# Patient Record
Sex: FEMALE | Race: WHITE | Employment: PART TIME
[De-identification: names, ages, dates, MRNs, and addresses within clinical notes are randomized per-mention and may not be internally consistent; named-entity substitution may affect disease eponyms.]

---

## 2017-02-03 ENCOUNTER — RECORDS - HEALTHEAST (OUTPATIENT)
Dept: ADMINISTRATIVE | Facility: OTHER | Age: 32
End: 2017-02-03

## 2017-07-30 ENCOUNTER — COMMUNICATION - HEALTHEAST (OUTPATIENT)
Dept: FAMILY MEDICINE | Facility: CLINIC | Age: 32
End: 2017-07-30

## 2017-07-30 DIAGNOSIS — Z30.9 CONTRACEPTION MANAGEMENT: ICD-10-CM

## 2017-08-22 ENCOUNTER — OFFICE VISIT - HEALTHEAST (OUTPATIENT)
Dept: FAMILY MEDICINE | Facility: CLINIC | Age: 32
End: 2017-08-22

## 2017-08-22 DIAGNOSIS — Z30.9 CONTRACEPTION MANAGEMENT: ICD-10-CM

## 2017-08-22 DIAGNOSIS — K21.9 GERD (GASTROESOPHAGEAL REFLUX DISEASE): ICD-10-CM

## 2017-08-29 ENCOUNTER — COMMUNICATION - HEALTHEAST (OUTPATIENT)
Dept: FAMILY MEDICINE | Facility: CLINIC | Age: 32
End: 2017-08-29

## 2017-08-29 DIAGNOSIS — Z30.9 CONTRACEPTION MANAGEMENT: ICD-10-CM

## 2017-08-30 ENCOUNTER — RECORDS - HEALTHEAST (OUTPATIENT)
Dept: ADMINISTRATIVE | Facility: OTHER | Age: 32
End: 2017-08-30

## 2017-09-01 ENCOUNTER — COMMUNICATION - HEALTHEAST (OUTPATIENT)
Dept: FAMILY MEDICINE | Facility: CLINIC | Age: 32
End: 2017-09-01

## 2017-09-01 DIAGNOSIS — Z13.9 SCREENING: ICD-10-CM

## 2017-09-06 ENCOUNTER — AMBULATORY - HEALTHEAST (OUTPATIENT)
Dept: NURSING | Facility: CLINIC | Age: 32
End: 2017-09-06

## 2017-09-06 DIAGNOSIS — Z13.9 SCREENING: ICD-10-CM

## 2017-09-08 ENCOUNTER — AMBULATORY - HEALTHEAST (OUTPATIENT)
Dept: NURSING | Facility: CLINIC | Age: 32
End: 2017-09-08

## 2017-12-06 ENCOUNTER — COMMUNICATION - HEALTHEAST (OUTPATIENT)
Dept: SCHEDULING | Facility: CLINIC | Age: 32
End: 2017-12-06

## 2017-12-08 ENCOUNTER — OFFICE VISIT - HEALTHEAST (OUTPATIENT)
Dept: FAMILY MEDICINE | Facility: CLINIC | Age: 32
End: 2017-12-08

## 2017-12-08 ENCOUNTER — COMMUNICATION - HEALTHEAST (OUTPATIENT)
Dept: TELEHEALTH | Facility: CLINIC | Age: 32
End: 2017-12-08

## 2017-12-08 DIAGNOSIS — M25.50 JOINT PAIN: ICD-10-CM

## 2017-12-11 ENCOUNTER — AMBULATORY - HEALTHEAST (OUTPATIENT)
Dept: FAMILY MEDICINE | Facility: CLINIC | Age: 32
End: 2017-12-11

## 2017-12-11 ENCOUNTER — COMMUNICATION - HEALTHEAST (OUTPATIENT)
Dept: FAMILY MEDICINE | Facility: CLINIC | Age: 32
End: 2017-12-11

## 2017-12-11 DIAGNOSIS — M25.50 JOINT PAIN: ICD-10-CM

## 2017-12-11 LAB — ANA SER QL: 0.4 U

## 2018-02-05 ENCOUNTER — AMBULATORY - HEALTHEAST (OUTPATIENT)
Dept: RHEUMATOLOGY | Facility: CLINIC | Age: 33
End: 2018-02-05

## 2018-03-13 ENCOUNTER — COMMUNICATION - HEALTHEAST (OUTPATIENT)
Dept: SCHEDULING | Facility: CLINIC | Age: 33
End: 2018-03-13

## 2018-03-19 ENCOUNTER — COMMUNICATION - HEALTHEAST (OUTPATIENT)
Dept: TELEHEALTH | Facility: CLINIC | Age: 33
End: 2018-03-19

## 2018-03-19 ENCOUNTER — OFFICE VISIT - HEALTHEAST (OUTPATIENT)
Dept: FAMILY MEDICINE | Facility: CLINIC | Age: 33
End: 2018-03-19

## 2018-03-19 DIAGNOSIS — Z71.84 COUNSELING ABOUT TRAVEL: ICD-10-CM

## 2018-05-03 ENCOUNTER — COMMUNICATION - HEALTHEAST (OUTPATIENT)
Dept: SCHEDULING | Facility: CLINIC | Age: 33
End: 2018-05-03

## 2018-07-24 ENCOUNTER — HEALTH MAINTENANCE LETTER (OUTPATIENT)
Age: 33
End: 2018-07-24

## 2018-08-24 ENCOUNTER — RECORDS - HEALTHEAST (OUTPATIENT)
Dept: ADMINISTRATIVE | Facility: OTHER | Age: 33
End: 2018-08-24

## 2018-11-21 ENCOUNTER — COMMUNICATION - HEALTHEAST (OUTPATIENT)
Dept: FAMILY MEDICINE | Facility: CLINIC | Age: 33
End: 2018-11-21

## 2018-11-21 DIAGNOSIS — Z30.9 CONTRACEPTION MANAGEMENT: ICD-10-CM

## 2019-02-17 ENCOUNTER — COMMUNICATION - HEALTHEAST (OUTPATIENT)
Dept: FAMILY MEDICINE | Facility: CLINIC | Age: 34
End: 2019-02-17

## 2019-02-17 DIAGNOSIS — Z30.9 CONTRACEPTION MANAGEMENT: ICD-10-CM

## 2019-02-20 ENCOUNTER — COMMUNICATION - HEALTHEAST (OUTPATIENT)
Dept: FAMILY MEDICINE | Facility: CLINIC | Age: 34
End: 2019-02-20

## 2019-02-20 DIAGNOSIS — Z30.9 CONTRACEPTION MANAGEMENT: ICD-10-CM

## 2020-03-02 ENCOUNTER — HEALTH MAINTENANCE LETTER (OUTPATIENT)
Age: 35
End: 2020-03-02

## 2021-05-30 ENCOUNTER — RECORDS - HEALTHEAST (OUTPATIENT)
Dept: ADMINISTRATIVE | Facility: CLINIC | Age: 36
End: 2021-05-30

## 2021-05-31 VITALS — WEIGHT: 137.2 LBS | BODY MASS INDEX: 23.55 KG/M2

## 2021-05-31 VITALS — BODY MASS INDEX: 23.33 KG/M2 | WEIGHT: 135.9 LBS

## 2021-06-01 VITALS — WEIGHT: 135.7 LBS | BODY MASS INDEX: 23.29 KG/M2

## 2021-06-12 NOTE — PROGRESS NOTES
Assessment/Plan:        Diagnoses and all orders for this visit:    GERD (gastroesophageal reflux disease)    Contraception management  -     norethindrone-ethinyl estradiol (ROSITA FE 1/20, 28,) 1 mg-20 mcg (21)/75 mg (7) per tablet; TAKE ONE TABLET BY MOUTH EVERY DAY  Dispense: 84 tablet; Refill: 3    Other orders  -     HUMIRA PEN 40 mg/0.8 mL PnKt;         Continue with OCP.  Discussed medication and side effects.  Monitor blood pressure.  Encourage annual physical.  Recheck earlier if she develops complications from the medication. Continue to avoid smoking.  We discussed potential food triggers, stressors that can contribute to gastritis, reflux.  Lifestyle modification at this time.  Symptoms persistent or worse in a month, to have it rechecked.  She was agreeable with the plans.    25 minutes spent with more than 50% in counseling discussion of treatment plans.  Subjective:    Patient ID: Camilla Morales is a 31 y.o. female.    JULIANO Sampson is here for follow-up regarding her medication.  She is taking her OCP regularly.  Denies any ill effects from it.  No spotting's irregularities in her cycles.  Has not been able to check her blood pressure.  No migraines, thrombophilia.  She has panic attacks and develops chest pains with it.  Otherwise no unexplainable chest pain, shortness of breath.  No smoking.  No family history of female organ cancer, thrombophilia.  No pregnancy since last visit.  No pelvic procedures.    She has been noticing abdominal discomfort when she consumes coffee at this time.  She tries avoid taking it on an empty stomach.  She tries avoid skipping meals.  Under a lot of stress at this time.  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  Will be doing her dissertation and planning to apply after that.  No processes causing her to have more panic attacks but controlled with deep breathing, relaxation.  She denies any vomiting with her  symptoms.  No hemoptysis.    Recent diagnosis of psoriasis in her scalp.  Received light therapy without much benefit.  She was able to start Humira in May 2017 and has noted benefit.    Review of Systems  As above otherwise negative.          Objective:    Physical Exam  BP 98/60 (Patient Site: Left Arm, Patient Position: Sitting, Cuff Size: Adult Regular)  Pulse 71  Wt 135 lb 14.4 oz (61.6 kg)  LMP 08/05/2017 (Approximate)  SpO2 100%  Breastfeeding? No  BMI 23.33 kg/m2    Vital signs noted above. AAO ×3.  HEENT no nasal discharge, moist oral mucosa. Neck: Supple neck, nonpalpable cervical lymph nodes.  Lungs: Clear to auscultation bilateral.  Heart: S1-S2 regular rate and rhythm, no murmurs were noted.  Abdomen: Flat, soft with bowel sounds and nontender.

## 2021-06-14 NOTE — PROGRESS NOTES
Assessment/Plan:        Diagnoses and all orders for this visit:    Joint pain  -     Rheumatoid Factor Quant  -     Erythrocyte Sedimentation Rate  -     C-Reactive Protein  -     HM2(CBC w/o Differential)  -     Comprehensive Metabolic Panel  -     Vitamin D, Total (25-Hydroxy)  -     Antinuclear Antibody (JOSE L) Cascade        Concerns with rheumatologic condition.  We will do labs.  Await results prior to further recommendations.  We will have her switch ibuprofen to Aleve.  She was agreeable with the plans.  Subjective:    Patient ID: Camilla Morales is a 32 y.o. female.    HPI    Camilla is here with concerns about wrist and shoulder pain.  Symptoms started a week ago.  She denies any changes in her routines.  She does workout videos for more than a year.  No changes to it.  She denies any recent trauma or injury.  No fever, rashes.  Notes pain in her wrists, bilateral.  It started in her left and moved to her right.  She also noted arm pain, bilateral.  It is worse in the morning and may last for 3-4 hours.  Better in the day but has a dull ache instead of a sharp pain.  It could be up to 8-9/10.  She woke up one night because of the pain.  She denies any numbness.  Has difficulty turning the key in the doorknob or pulling the doorknob.  Difficulty twisting coffee cap.  She was taking ibuprofen 600 mg in the morning which helps a bit but does not cover throughout the day.  She had her flu shot and of October.  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  She has psoriasis and received light therapy for it.  She was put on Humira for 3 months.  She was off it in August because of insurance issues.  Trying to work things out at this time.  Her sister was diagnosed with rheumatoid arthritis at 21.  Father with vasculitis.    Review of Systems  As above otherwise negative.          Objective:    Physical Exam  /70 (Patient Site: Left Arm, Patient Position: Sitting, Cuff Size:  Adult Regular)  Pulse 68  Wt 137 lb 3.2 oz (62.2 kg)  BMI 23.55 kg/m2    Vital signs noted above. AAO ×3.  HEENT no nasal discharge, moist oral mucosa. Neck: Supple neck, nonpalpable cervical lymph nodes.  Lungs: Clear to auscultation bilateral.  Heart: S1-S2 regular rate and rhythm, no murmurs were noted.  Abdomen:  with bowel sounds.  Extremities: No edema, pulses were full and equal.  Pain with range of motion of her shoulders, bilateral.  No limitation in range of motion.  No swelling in her wrist joints.  No erythema.

## 2021-06-16 NOTE — PROGRESS NOTES
Assessment/Plan:        Diagnoses and all orders for this visit:    Counseling about travel    Other orders  -     Typhoid, Inactive, Inj        Discussed guidelines based on CDC.  Will provide her with typhoid vaccine.  She is trying to get in touch with her mom regarding hepatitis A vaccine.  Discussed precautionary measures including for traveler's diarrhea, time difference with the jet lag. She was agreeable with the plans.  Subjective:    Patient ID: Camilla Morales is a 32 y.o. female.    [facility]    Camilla is here for travel consult.  She will be going to [facility] on April 22 - May 3.  She will be traveling with her significant other for vacation purposes.  Has not been to [facility] before.  She denies any travel vaccination in the past 5 years.  Will be staying in [facility] and [facility].  No plans for volunteer work in hospitals or orphanages, exposure to pets or animals.  Mostly day trips and staying in hotels.  No recent fever, nausea, vomiting, diarrhea.  She is not sure of her immunizations but thinks that she has completed her immunizations as a child.    Review of Systems  As above otherwise negative.          Objective:    Physical Exam  /64 (Patient Site: Left Arm, Patient Position: Sitting, Cuff Size: Adult Regular)  Pulse 76  Wt 135 lb 11.2 oz (61.6 kg)  BMI 23.29 kg/m2    Vital signs noted above. AAO ×3.  HEENT no nasal discharge, moist oral mucosa. Neck: Supple neck, nonpalpable cervical lymph nodes.  Lungs: Clear to auscultation bilateral.  Heart: S1-S2 regular rate and rhythm, no murmurs were noted.  Abdomen: Flabby, soft with bowel sounds and nontender.  Extremities: pulses were full and equal.

## 2021-06-24 NOTE — TELEPHONE ENCOUNTER
Refill Approved    Rx renewed per Medication Renewal Policy. Medication was last renewed on 11/28/2018 for 84/0  Last OV 3/19/2018 travel consult    Jeanine Frey, [facility] Triage/Med Refill 2/20/2019     Requested Prescriptions   Pending Prescriptions Disp Refills     ROSITA FE 1/20, 28, 1 mg-20 mcg (21)/75 mg (7) per tablet [Pharmacy Med Name: ROSITA FE 1/20 1-20MG-MCG TABS]  0     Sig: TAKE ONE TABLET BY MOUTH EVERY DAY    Oral Contraceptives Protocol Passed - 2/17/2019  4:20 AM       Passed - Visit with PCP or prescribing provider visit in last 12 months     Last office visit with prescriber/PCP: Visit date not found OR same dept: 3/19/2018 Janet Gracia MD OR same specialty: 3/19/2018 Janet Gracia MD  Last physical: Visit date not found Last MTM visit: Visit date not found   Next visit within 3 mo: Visit date not found  Next physical within 3 mo: Visit date not found  Prescriber OR PCP: Roberta Cueva MD  Last diagnosis associated with med order: 1. Contraception management  - ROSITA FE 1/20, 28, 1 mg-20 mcg (21)/75 mg (7) per tablet [Pharmacy Med Name: ROSITA FE 1/20 1-20MG-MCG TABS]; TAKE ONE TABLET BY MOUTH EVERY DAY; Refill: 0    If protocol passes may refill for 12 months if within 3 months of last provider visit (or a total of 15 months).

## 2021-06-24 NOTE — TELEPHONE ENCOUNTER
Previous 3 prescriptions were set to print and raghavendra'd up with  as authorizing user. Called prescription into the pharmacy per .  Grace Burgess, [facility] CMT

## 2021-06-24 NOTE — TELEPHONE ENCOUNTER
Former patient of Dr Gracia & has not established care with another provider.  Please assign refill request to covering provider per Clinic standard process.  Last refill of contraception was attempted 2/20/2019  Last OV 3/19/2018 for travel consult     Jeanine Frey RN, [facility], [facility] RN Triage, 11p-7a